# Patient Record
Sex: MALE
[De-identification: names, ages, dates, MRNs, and addresses within clinical notes are randomized per-mention and may not be internally consistent; named-entity substitution may affect disease eponyms.]

---

## 2023-02-27 PROBLEM — Z00.00 ENCOUNTER FOR PREVENTIVE HEALTH EXAMINATION: Status: ACTIVE | Noted: 2023-02-27

## 2023-03-13 ENCOUNTER — APPOINTMENT (OUTPATIENT)
Dept: PHYSICAL MEDICINE AND REHAB | Facility: CLINIC | Age: 28
End: 2023-03-13
Payer: COMMERCIAL

## 2023-03-13 VITALS
DIASTOLIC BLOOD PRESSURE: 80 MMHG | WEIGHT: 160 LBS | HEART RATE: 80 BPM | SYSTOLIC BLOOD PRESSURE: 117 MMHG | HEIGHT: 70 IN | BODY MASS INDEX: 22.9 KG/M2 | OXYGEN SATURATION: 98 %

## 2023-03-13 DIAGNOSIS — Z78.9 OTHER SPECIFIED HEALTH STATUS: ICD-10-CM

## 2023-03-13 DIAGNOSIS — M54.6 PAIN IN THORACIC SPINE: ICD-10-CM

## 2023-03-13 DIAGNOSIS — M79.18 MYALGIA, OTHER SITE: ICD-10-CM

## 2023-03-13 DIAGNOSIS — M54.2 CERVICALGIA: ICD-10-CM

## 2023-03-13 PROCEDURE — 99203 OFFICE O/P NEW LOW 30 MIN: CPT

## 2023-03-13 RX ORDER — LISDEXAMFETAMINE DIMESYLATE 70 MG/1
70 CAPSULE ORAL
Qty: 30 | Refills: 0 | Status: ACTIVE | COMMUNITY
Start: 2023-02-10

## 2023-03-13 NOTE — HISTORY OF PRESENT ILLNESS
[FreeTextEntry1] : 03/13/2023 Mr. CHRISTINA MCCONNELL is a very pleasant 28 year male who comes in for evaluation of Neck /Mid-Back Pain that has been ongoing for 2-3 years after a fall while snowboarding, and perpetuated with extensive hours of braulio. Patient has had no treatment. The pain is located primarily Neck Pain radiating to the collar bone/ mid-Back Pain. intermittent and described as achy, tight . The pain is rated as 3/10 and ranges from 18/10. The patient's symptoms are aggravated by carrying, lifting, slouching and alleviated by rest, hot bath . The patient works as  which consists of standing, jumping. The patient denies any night pain, numbness/tingling, weakness, or bowel/bladder dysfunction. The patient has no other complaints at this time.\par \par \par \par

## 2023-03-13 NOTE — PHYSICAL EXAM
[FreeTextEntry1] : CERVICAL\par CERVICAL ROM: Flexion, extension, side-bending, rotation, oblique extension all full/pain free.  \par PALP: No TTP midline spinous processes, paravertebral muscles, trapezius, levator scapulae or shoulder region B/L.\par INSP: Spine alignment is midline, No evidence of scoliosis.\par STRENGTH: 5/5 bilateral shoulder abductors, elbow flexors, elbow extensors, wrist extensors, hand intrinsics, long finger flexors to D3 and D5.\par TONE: Normal, No clonus.\par REFLEXES: Symmetric biceps, triceps, brachioradialis, pronator teres. Starkey's (-) B/L.\par SENSATION: Grossly intact LT BUE.\par GAIT: Non antalgic, Normal reciprocating heel to toe.\par SPECIAL: Spurling's (-) B/L. Axial Compression (-).

## 2023-03-13 NOTE — ASSESSMENT
[FreeTextEntry1] : Impression:\par 1. Cervicalgia/Thoracalgia - Myofascial Pain\par 2. Poor Posture\par \par Plan: The history and physical examination were reviewed. The diagnosis was discussed with the patient along with treatment options. We reviewed activity modification, ergonomics, and posture in the long term management of the condition. At this time I am recommending that the patient undergo a course of physical therapy. We emphasized the importance of the home exercise program. The patient will follow up with Dr. Bates in 6-8 weeks. The patient expressed verbal understanding and is in agreement with the plan of care. All of the patient's questions and concerns were addressed during today's visit.\par \par A total of 30 minutes was spent for the duration of this encounter.\par